# Patient Record
Sex: FEMALE | Race: WHITE | NOT HISPANIC OR LATINO
[De-identification: names, ages, dates, MRNs, and addresses within clinical notes are randomized per-mention and may not be internally consistent; named-entity substitution may affect disease eponyms.]

---

## 2017-10-11 PROBLEM — Z00.00 ENCOUNTER FOR PREVENTIVE HEALTH EXAMINATION: Status: ACTIVE | Noted: 2017-10-11

## 2017-10-24 ENCOUNTER — LABORATORY RESULT (OUTPATIENT)
Age: 37
End: 2017-10-24

## 2017-10-24 ENCOUNTER — APPOINTMENT (OUTPATIENT)
Dept: PULMONOLOGY | Facility: CLINIC | Age: 37
End: 2017-10-24
Payer: COMMERCIAL

## 2017-10-24 PROCEDURE — 93000 ELECTROCARDIOGRAM COMPLETE: CPT

## 2017-10-24 PROCEDURE — 36415 COLL VENOUS BLD VENIPUNCTURE: CPT

## 2017-10-24 PROCEDURE — 99203 OFFICE O/P NEW LOW 30 MIN: CPT | Mod: 25

## 2017-10-25 VITALS
HEART RATE: 59 BPM | TEMPERATURE: 97 F | WEIGHT: 158.07 LBS | DIASTOLIC BLOOD PRESSURE: 59 MMHG | SYSTOLIC BLOOD PRESSURE: 110 MMHG | OXYGEN SATURATION: 100 % | HEIGHT: 55 IN | RESPIRATION RATE: 16 BRPM

## 2017-10-25 NOTE — ASU PREOP CHECKLIST - SELECT TESTS ORDERED
CBC/EKG/Type and Screen/HCG/CMP/O+/Urinalysis/PT/PTT/INR O+     urine hcg-neg/PT/PTT/HCG/INR/CBC/Urinalysis/EKG/Type and Screen/CMP

## 2017-10-25 NOTE — ASU PATIENT PROFILE, ADULT - NS PRO AD PATIENT TYPE
Health Care Proxy (HCP)/Johnnie () Health Care Proxy (HCP)/Johnnie () [facility]   [phone]

## 2017-10-26 ENCOUNTER — INPATIENT (INPATIENT)
Facility: HOSPITAL | Age: 37
LOS: 0 days | Discharge: HOME | DRG: 743 | End: 2017-10-27
Attending: OBSTETRICS & GYNECOLOGY | Admitting: OBSTETRICS & GYNECOLOGY
Payer: COMMERCIAL

## 2017-10-26 ENCOUNTER — RESULT REVIEW (OUTPATIENT)
Age: 37
End: 2017-10-26

## 2017-10-26 DIAGNOSIS — Z98.890 OTHER SPECIFIED POSTPROCEDURAL STATES: Chronic | ICD-10-CM

## 2017-10-26 LAB
BLD GP AB SCN SERPL QL: NEGATIVE — SIGNIFICANT CHANGE UP
RH IG SCN BLD-IMP: POSITIVE — SIGNIFICANT CHANGE UP

## 2017-10-26 RX ORDER — ONDANSETRON 8 MG/1
4 TABLET, FILM COATED ORAL ONCE
Qty: 0 | Refills: 0 | Status: DISCONTINUED | OUTPATIENT
Start: 2017-10-26 | End: 2017-10-27

## 2017-10-26 RX ORDER — LAMOTRIGINE 25 MG/1
100 TABLET, ORALLY DISINTEGRATING ORAL DAILY
Qty: 0 | Refills: 0 | Status: DISCONTINUED | OUTPATIENT
Start: 2017-10-27 | End: 2017-10-27

## 2017-10-26 RX ORDER — DOCUSATE SODIUM 100 MG
100 CAPSULE ORAL
Qty: 0 | Refills: 0 | Status: DISCONTINUED | OUTPATIENT
Start: 2017-10-26 | End: 2017-10-27

## 2017-10-26 RX ORDER — KETOROLAC TROMETHAMINE 30 MG/ML
30 SYRINGE (ML) INJECTION EVERY 6 HOURS
Qty: 0 | Refills: 0 | Status: DISCONTINUED | OUTPATIENT
Start: 2017-10-26 | End: 2017-10-27

## 2017-10-26 RX ORDER — METOCLOPRAMIDE HCL 10 MG
10 TABLET ORAL EVERY 6 HOURS
Qty: 0 | Refills: 0 | Status: DISCONTINUED | OUTPATIENT
Start: 2017-10-26 | End: 2017-10-27

## 2017-10-26 RX ORDER — OXYCODONE AND ACETAMINOPHEN 5; 325 MG/1; MG/1
1 TABLET ORAL EVERY 6 HOURS
Qty: 0 | Refills: 0 | Status: DISCONTINUED | OUTPATIENT
Start: 2017-10-26 | End: 2017-10-27

## 2017-10-26 RX ORDER — SIMETHICONE 80 MG/1
80 TABLET, CHEWABLE ORAL
Qty: 0 | Refills: 0 | Status: DISCONTINUED | OUTPATIENT
Start: 2017-10-26 | End: 2017-10-27

## 2017-10-26 RX ORDER — LEVOTHYROXINE SODIUM 125 MCG
88 TABLET ORAL DAILY
Qty: 0 | Refills: 0 | Status: DISCONTINUED | OUTPATIENT
Start: 2017-10-27 | End: 2017-10-27

## 2017-10-26 RX ORDER — HYDROMORPHONE HYDROCHLORIDE 2 MG/ML
0.5 INJECTION INTRAMUSCULAR; INTRAVENOUS; SUBCUTANEOUS
Qty: 0 | Refills: 0 | Status: DISCONTINUED | OUTPATIENT
Start: 2017-10-26 | End: 2017-10-27

## 2017-10-26 RX ORDER — SODIUM CHLORIDE 9 MG/ML
1000 INJECTION, SOLUTION INTRAVENOUS
Qty: 0 | Refills: 0 | Status: DISCONTINUED | OUTPATIENT
Start: 2017-10-26 | End: 2017-10-27

## 2017-10-26 NOTE — PROGRESS NOTE ADULT - ASSESSMENT
37y Female POD# 0 s/p laparoscopic endo excision, dilation and curettage, hysteroscopy, recovering well                                     1. Neuro/Pain:  Toradol ATC  2  CV:   VS per routine  3. Pulm: Encourage ISS  4. GI: clears, AAT  5. :  Galvan in place, TOV in AM  6. Heme: AM CBC  7. ID: --  8. DVT ppx: SCDs  9. Dispo: Likely POD#1

## 2017-10-26 NOTE — PROGRESS NOTE ADULT - SUBJECTIVE AND OBJECTIVE BOX
GYN POC   Patient seen at bedside.  Feeling well.  Pain controlled. Tolerating sips.  Not OOB yet.  Galvan in place.    Denies CP, palpitations, SOB, fever, chills, nausea, vomiting.    Vital Signs Last 24 Hrs  T(C): 36.1 (26 Oct 2017 16:30), Max: 36.1 (26 Oct 2017 16:30)  T(F): 97 (26 Oct 2017 16:30), Max: 97 (26 Oct 2017 16:30)  HR: 66 (26 Oct 2017 19:00) (62 - 84)  BP: 101/58 (26 Oct 2017 19:00) (98/58 - 107/62)  BP(mean): 74 (26 Oct 2017 14:32) (70 - 74)  RR: 11 (26 Oct 2017 19:00) (0 - 20)  SpO2: 97% (26 Oct 2017 19:00) (97% - 100%)    Physical Exam:  Gen: No Acute Distress  Pulm: Clear to auscultation bilaterally  GI: soft, nontender, mildly distended, decreased BS, no rebound, no guarding.  Port sites c/d/i with gauze/tegaderms; ovarian suspensions in place bilaterally  : Galvan in place  Ext: SCDs in place, wnl    I&O's Summary    26 Oct 2017 07:01  -  26 Oct 2017 21:19  --------------------------------------------------------  IN: 455 mL / OUT: 295 mL / NET: 160 mL      MEDICATIONS  (STANDING):  docusate sodium 100 milliGRAM(s) Oral two times a day  ketorolac   Injectable 30 milliGRAM(s) IV Push every 6 hours  lactated ringers. 1000 milliLiter(s) (125 mL/Hr) IV Continuous <Continuous>  simethicone 80 milliGRAM(s) Chew two times a day    MEDICATIONS  (PRN):  HYDROmorphone  Injectable 0.5 milliGRAM(s) IV Push every 10 minutes PRN Severe Pain (7 - 10)  metoclopramide Injectable 10 milliGRAM(s) IV Push every 6 hours PRN Nausea and/or Vomiting  ondansetron Injectable 4 milliGRAM(s) IV Push once PRN Postoperative Nausea and/or Vomiting  oxyCODONE    5 mG/acetaminophen 325 mG 1 Tablet(s) Oral every 6 hours PRN Severe Pain (7 - 10)

## 2017-10-27 ENCOUNTER — TRANSCRIPTION ENCOUNTER (OUTPATIENT)
Age: 37
End: 2017-10-27

## 2017-10-27 VITALS
DIASTOLIC BLOOD PRESSURE: 62 MMHG | SYSTOLIC BLOOD PRESSURE: 104 MMHG | RESPIRATION RATE: 18 BRPM | HEART RATE: 63 BPM | OXYGEN SATURATION: 98 %

## 2017-10-27 LAB
HCT VFR BLD CALC: 32 % — LOW (ref 34.5–45)
HGB BLD-MCNC: 11.2 G/DL — LOW (ref 11.5–15.5)
MCHC RBC-ENTMCNC: 31.4 PG — SIGNIFICANT CHANGE UP (ref 27–34)
MCHC RBC-ENTMCNC: 35 G/DL — SIGNIFICANT CHANGE UP (ref 32–36)
MCV RBC AUTO: 89.6 FL — SIGNIFICANT CHANGE UP (ref 80–100)
PLATELET # BLD AUTO: 189 K/UL — SIGNIFICANT CHANGE UP (ref 150–400)
RBC # BLD: 3.57 M/UL — LOW (ref 3.8–5.2)
RBC # FLD: 12.3 % — SIGNIFICANT CHANGE UP (ref 10.3–16.9)
WBC # BLD: 5.1 K/UL — SIGNIFICANT CHANGE UP (ref 3.8–10.5)
WBC # FLD AUTO: 5.1 K/UL — SIGNIFICANT CHANGE UP (ref 3.8–10.5)

## 2017-10-27 PROCEDURE — 88305 TISSUE EXAM BY PATHOLOGIST: CPT

## 2017-10-27 PROCEDURE — 36415 COLL VENOUS BLD VENIPUNCTURE: CPT

## 2017-10-27 PROCEDURE — 86901 BLOOD TYPING SEROLOGIC RH(D): CPT

## 2017-10-27 PROCEDURE — 86900 BLOOD TYPING SEROLOGIC ABO: CPT

## 2017-10-27 PROCEDURE — 86850 RBC ANTIBODY SCREEN: CPT

## 2017-10-27 PROCEDURE — 85027 COMPLETE CBC AUTOMATED: CPT

## 2017-10-27 RX ORDER — LEVOTHYROXINE SODIUM 125 MCG
88 TABLET ORAL DAILY
Qty: 0 | Refills: 0 | Status: DISCONTINUED | OUTPATIENT
Start: 2017-10-27 | End: 2017-10-27

## 2017-10-27 RX ORDER — IBUPROFEN 200 MG
600 TABLET ORAL EVERY 6 HOURS
Qty: 0 | Refills: 0 | Status: DISCONTINUED | OUTPATIENT
Start: 2017-10-27 | End: 2017-10-27

## 2017-10-27 RX ORDER — SODIUM CHLORIDE 9 MG/ML
3 INJECTION INTRAMUSCULAR; INTRAVENOUS; SUBCUTANEOUS EVERY 8 HOURS
Qty: 0 | Refills: 0 | Status: DISCONTINUED | OUTPATIENT
Start: 2017-10-27 | End: 2017-10-27

## 2017-10-27 RX ADMIN — Medication 100 MILLIGRAM(S): at 06:04

## 2017-10-27 RX ADMIN — Medication 30 MILLIGRAM(S): at 06:04

## 2017-10-27 RX ADMIN — Medication 30 MILLIGRAM(S): at 07:05

## 2017-10-27 RX ADMIN — SIMETHICONE 80 MILLIGRAM(S): 80 TABLET, CHEWABLE ORAL at 06:04

## 2017-10-27 NOTE — DISCHARGE NOTE ADULT - CARE PLAN
Principal Discharge DX:	Endometriosis  Goal:	resumption of daily activity  Instructions for follow-up, activity and diet:	no heavy lifting for two weeks. call to schedule follow up appt in 1 week. tylenol and motrin as needed for pain.

## 2017-10-27 NOTE — DISCHARGE NOTE ADULT - PATIENT PORTAL LINK FT
“You can access the FollowHealth Patient Portal, offered by [facility], by registering with the following website: [email or web address]”

## 2017-10-27 NOTE — PROGRESS NOTE ADULT - ASSESSMENT
37y Female POD#1     s/p  L/S endo excision, hysteroscopy and D&C    1. Neuro/Pain:  OPM  2  CV:   VS per routine  3. Pulm: Encourage ISS  4. GI: Reg  5. :  TOV  6. Heme: Stable  7. ID: --  8. DVT ppx: SCDs  9. Dispo: Likely POD#1

## 2017-10-27 NOTE — PROGRESS NOTE ADULT - SUBJECTIVE AND OBJECTIVE BOX
Pt evaluated at bedside.  She reports pain is well controlled. She is tolerating clear liquids but has not yet passed flatus. h silver heavy vaginal bleeding.   She denies HA, dizzines, SOB, CP, palpitations, n/v.    T(C): 36.6 (10-27-17 @ 06:11), Max: 36.6 (10-27-17 @ 06:11)  HR: 54 (10-27-17 @ 06:11) (54 - 70)  BP: 101/61 (10-27-17 @ 06:11) (91/48 - 102/58)  RR: 15 (10-27-17 @ 06:11) (12 - 22)  SpO2: 100% (10-27-17 @ 06:11) (96% - 100%)  GA:  CV: RRR, no MRG  Pulm: CTAB  Abd: +BS, soft, NTND, no rebound or guarding: incisions C/D/I  : virgen in place  Extrem: SCDs in place    10-26 @ 07:01  -  10-27 @ 07:00  --------------------------------------------------------  IN: 1830 mL / OUT: 1665 mL / NET: 165 mL                              11.2   5.1   )-----------( 189      ( 27 Oct 2017 05:33 )             32.0

## 2017-10-27 NOTE — DISCHARGE NOTE ADULT - CARE PROVIDER_API CALL
Oneida Phillips), Obstetrics and Gynecology  [address] [place] [address]  Phone: [phone]  Fax: [phone]    Pat Almanzar (), Obstetrics and Gynecology  [address] [place] [address]  Phone: [phone]  Fax: [phone]

## 2017-10-27 NOTE — DISCHARGE NOTE ADULT - PLAN OF CARE
resumption of daily activity no heavy lifting for two weeks. call to schedule follow up appt in 1 week. tylenol and motrin as needed for pain.

## 2017-11-01 LAB — SURGICAL PATHOLOGY STUDY: SIGNIFICANT CHANGE UP

## 2017-11-05 DIAGNOSIS — N80.3 ENDOMETRIOSIS OF PELVIC PERITONEUM: ICD-10-CM

## 2017-11-05 DIAGNOSIS — N80.2 ENDOMETRIOSIS OF FALLOPIAN TUBE: ICD-10-CM

## 2017-11-05 DIAGNOSIS — E03.9 HYPOTHYROIDISM, UNSPECIFIED: ICD-10-CM

## 2017-11-05 DIAGNOSIS — N80.1 ENDOMETRIOSIS OF OVARY: ICD-10-CM

## 2017-11-05 DIAGNOSIS — N80.0 ENDOMETRIOSIS OF UTERUS: ICD-10-CM

## 2017-11-05 DIAGNOSIS — N80.9 ENDOMETRIOSIS, UNSPECIFIED: ICD-10-CM

## 2017-11-05 DIAGNOSIS — F41.9 ANXIETY DISORDER, UNSPECIFIED: ICD-10-CM

## 2017-11-05 DIAGNOSIS — N80.8 OTHER ENDOMETRIOSIS: ICD-10-CM

## 2017-11-05 DIAGNOSIS — N80.5 ENDOMETRIOSIS OF INTESTINE: ICD-10-CM

## 2019-06-10 ENCOUNTER — HOSPITAL ENCOUNTER (OUTPATIENT)
Dept: MAMMOGRAPHY | Age: 39
Discharge: HOME | End: 2019-06-10
Attending: OBSTETRICS & GYNECOLOGY
Payer: COMMERCIAL

## 2019-06-10 DIAGNOSIS — N63.20 LEFT BREAST LUMP: ICD-10-CM

## 2019-06-10 DIAGNOSIS — Z98.890 HISTORY OF LUMPECTOMY: ICD-10-CM

## 2019-06-10 PROCEDURE — 77066 DX MAMMO INCL CAD BI: CPT

## 2019-06-10 PROCEDURE — 76642 ULTRASOUND BREAST LIMITED: CPT

## 2019-11-12 ENCOUNTER — HOSPITAL ENCOUNTER (OUTPATIENT)
Dept: MAMMOGRAPHY | Age: 39
Discharge: HOME | End: 2019-11-12
Attending: OBSTETRICS & GYNECOLOGY
Payer: COMMERCIAL

## 2019-11-12 DIAGNOSIS — N63.20 LEFT BREAST LUMP: ICD-10-CM

## 2019-11-12 DIAGNOSIS — R92.2 BREAST DENSITY: ICD-10-CM

## 2019-11-12 PROCEDURE — 76642 ULTRASOUND BREAST LIMITED: CPT

## 2019-11-12 PROCEDURE — 77065 DX MAMMO INCL CAD UNI: CPT

## 2019-11-14 ENCOUNTER — HOSPITAL ENCOUNTER (OUTPATIENT)
Dept: MRI IMAGING | Age: 39
Discharge: HOME | End: 2019-11-14
Attending: OBSTETRICS & GYNECOLOGY
Payer: COMMERCIAL

## 2019-11-14 DIAGNOSIS — R92.8 ABNORMAL FINDING ON BREAST IMAGING: ICD-10-CM

## 2019-11-14 PROCEDURE — 74011000258 HC RX REV CODE- 258: Performed by: OBSTETRICS & GYNECOLOGY

## 2019-11-14 PROCEDURE — 77049 MRI BREAST C-+ W/CAD BI: CPT

## 2019-11-14 PROCEDURE — A9575 INJ GADOTERATE MEGLUMI 0.1ML: HCPCS | Performed by: OBSTETRICS & GYNECOLOGY

## 2019-11-14 PROCEDURE — 74011250636 HC RX REV CODE- 250/636: Performed by: OBSTETRICS & GYNECOLOGY

## 2019-11-14 RX ORDER — SODIUM CHLORIDE 0.9 % (FLUSH) 0.9 %
10 SYRINGE (ML) INJECTION
Status: COMPLETED | OUTPATIENT
Start: 2019-11-14 | End: 2019-11-14

## 2019-11-14 RX ORDER — GADOTERATE MEGLUMINE 376.9 MG/ML
15 INJECTION INTRAVENOUS
Status: COMPLETED | OUTPATIENT
Start: 2019-11-14 | End: 2019-11-14

## 2019-11-14 RX ADMIN — Medication 10 ML: at 11:31

## 2019-11-14 RX ADMIN — SODIUM CHLORIDE 100 ML: 900 INJECTION, SOLUTION INTRAVENOUS at 11:31

## 2019-11-14 RX ADMIN — GADOTERATE MEGLUMINE 15 ML: 376.9 INJECTION INTRAVENOUS at 11:31

## 2019-11-18 ENCOUNTER — HOSPITAL ENCOUNTER (OUTPATIENT)
Dept: MAMMOGRAPHY | Age: 39
Discharge: HOME | End: 2019-11-18
Attending: OBSTETRICS & GYNECOLOGY
Payer: COMMERCIAL

## 2019-11-18 VITALS — SYSTOLIC BLOOD PRESSURE: 122 MMHG | DIASTOLIC BLOOD PRESSURE: 87 MMHG

## 2019-11-18 DIAGNOSIS — R92.8 ABNORMAL MAMMOGRAM OF LEFT BREAST: ICD-10-CM

## 2019-11-18 DIAGNOSIS — N63.20 MASS OF LEFT BREAST: ICD-10-CM

## 2019-11-18 PROCEDURE — 74011000250 HC RX REV CODE- 250: Performed by: OBSTETRICS & GYNECOLOGY

## 2019-11-18 PROCEDURE — 88305 TISSUE EXAM BY PATHOLOGIST: CPT

## 2019-11-18 PROCEDURE — 77065 DX MAMMO INCL CAD UNI: CPT

## 2019-11-18 PROCEDURE — 19083 BX BREAST 1ST LESION US IMAG: CPT

## 2019-11-18 RX ORDER — LIDOCAINE HYDROCHLORIDE 10 MG/ML
5 INJECTION INFILTRATION; PERINEURAL
Status: COMPLETED | OUTPATIENT
Start: 2019-11-18 | End: 2019-11-18

## 2019-11-18 RX ADMIN — LIDOCAINE HYDROCHLORIDE 5 ML: 10 INJECTION, SOLUTION INFILTRATION; PERINEURAL at 10:32

## 2019-11-22 ENCOUNTER — HOSPITAL ENCOUNTER (OUTPATIENT)
Dept: MAMMOGRAPHY | Age: 39
Discharge: HOME | End: 2019-11-22
Attending: OBSTETRICS & GYNECOLOGY
Payer: COMMERCIAL

## 2019-11-22 VITALS — OXYGEN SATURATION: 100 % | SYSTOLIC BLOOD PRESSURE: 109 MMHG | DIASTOLIC BLOOD PRESSURE: 71 MMHG | TEMPERATURE: 99 F

## 2019-11-22 DIAGNOSIS — R92.8 ABNORMAL MRI, BREAST: ICD-10-CM

## 2019-11-22 PROCEDURE — 74011250636 HC RX REV CODE- 250/636: Performed by: OBSTETRICS & GYNECOLOGY

## 2019-11-22 PROCEDURE — 19081 BX BREAST 1ST LESION STRTCTC: CPT

## 2019-11-22 PROCEDURE — 88305 TISSUE EXAM BY PATHOLOGIST: CPT

## 2019-11-22 PROCEDURE — 74011000250 HC RX REV CODE- 250: Performed by: OBSTETRICS & GYNECOLOGY

## 2019-11-22 PROCEDURE — 77065 DX MAMMO INCL CAD UNI: CPT

## 2019-11-22 RX ORDER — LIDOCAINE HYDROCHLORIDE 10 MG/ML
5 INJECTION INFILTRATION; PERINEURAL
Status: COMPLETED | OUTPATIENT
Start: 2019-11-22 | End: 2019-11-22

## 2019-11-22 RX ORDER — LIDOCAINE HYDROCHLORIDE AND EPINEPHRINE 10; 10 MG/ML; UG/ML
10 INJECTION, SOLUTION INFILTRATION; PERINEURAL
Status: COMPLETED | OUTPATIENT
Start: 2019-11-22 | End: 2019-11-22

## 2019-11-22 RX ADMIN — LIDOCAINE HYDROCHLORIDE AND EPINEPHRINE 15 ML: 10; 10 INJECTION, SOLUTION INFILTRATION; PERINEURAL at 10:21

## 2019-11-22 RX ADMIN — SODIUM CHLORIDE 250 ML: 900 INJECTION, SOLUTION INTRAVENOUS at 10:22

## 2019-11-22 RX ADMIN — LIDOCAINE HYDROCHLORIDE 5 ML: 10 INJECTION, SOLUTION INFILTRATION; PERINEURAL at 10:21

## 2019-11-25 NOTE — PROGRESS NOTES
I spoke with Ms Ihsan Haywood via phone regarding the results of her Lt breast stereo bx. Pathology: benign. She was a bit sore but is feeling much better today. She is being referred for a surgical consult to Dr Hudson Fowler on 92-3-80 @ 42:68.

## 2019-12-30 PROBLEM — N63.0 BREAST MASS IN FEMALE: Status: ACTIVE | Noted: 2019-12-30

## 2020-05-26 NOTE — DISCHARGE NOTE ADULT - BECAUSE OF A PHYSICAL, MENTAL OR EMOTIONAL CONDITION, DO YOU HAVE DIFFICULTY DOING  ERRANDS ALONE LIKE VISITING A DOCTOR'S OFFICE OR SHOPPING (15 YEARS AND OLDER)
PHYSICAL EXAM:  GENERAL: NAD, well-developed, tremulous  HEAD:  Atraumatic, Normocephalic  EYES: EOMI, PERRLA, conjunctiva and sclera clear  NECK: Supple, No JVD  CHEST/LUNG: Clear to auscultation bilaterally; No wheeze  HEART: Regular rate and rhythm; No murmurs, rubs, or gallops  ABDOMEN: Soft, Nontender, Nondistended; Bowel sounds present  EXTREMITIES:  2+ Peripheral Pulses, No clubbing, cyanosis, or edema  PSYCH: AAOx3  NEUROLOGY: non-focal  SKIN: No rashes or lesions
No

## 2022-09-12 PROBLEM — F41.9 ANXIETY DISORDER, UNSPECIFIED: Chronic | Status: ACTIVE | Noted: 2017-10-25

## 2022-09-12 PROBLEM — E03.9 HYPOTHYROIDISM, UNSPECIFIED: Chronic | Status: ACTIVE | Noted: 2017-10-25

## 2022-09-12 PROBLEM — N80.9 ENDOMETRIOSIS, UNSPECIFIED: Chronic | Status: ACTIVE | Noted: 2017-10-26

## 2022-09-14 ENCOUNTER — TRANSCRIPTION ENCOUNTER (OUTPATIENT)
Age: 42
End: 2022-09-14

## 2022-09-14 VITALS
WEIGHT: 179.02 LBS | HEART RATE: 65 BPM | HEIGHT: 66 IN | OXYGEN SATURATION: 97 % | SYSTOLIC BLOOD PRESSURE: 108 MMHG | DIASTOLIC BLOOD PRESSURE: 68 MMHG | TEMPERATURE: 98 F | RESPIRATION RATE: 16 BRPM

## 2022-09-14 RX ORDER — LAMOTRIGINE 25 MG/1
300 TABLET, ORALLY DISINTEGRATING ORAL
Qty: 0 | Refills: 0 | DISCHARGE

## 2022-09-14 RX ORDER — LEVOTHYROXINE SODIUM 125 MCG
1 TABLET ORAL
Qty: 0 | Refills: 0 | DISCHARGE

## 2022-09-14 RX ORDER — LAMOTRIGINE 25 MG/1
1 TABLET, ORALLY DISINTEGRATING ORAL
Qty: 0 | Refills: 0 | DISCHARGE

## 2022-09-14 NOTE — ASU PREOP CHECKLIST - HEIGHT IN FEET
EXAMINATION TYPE: CT angio chest

 

DATE OF EXAM: 5/1/2022

 

COMPARISON: Chest x-ray earlier today

 

HISTORY: covid, sob. Syncope and weakness. Elevated d-dimer.

 

CT DLP: 229.8 mGycm. Automated Exposure Control for Dose Reduction was Utilized.

 

CONTRAST: 

CTA scan of the thorax is performed with IV Contrast, patient injected with 100 mL of Isovue 370, pul
monary embolism protocol.   MIP Images are created on CT scanner and reviewed.

 

FINDINGS:

 

LUNGS: The lungs are grossly clear, there is no concerning parenchymal mass or nodule identified.   T
here is no pleural effusion or pneumothorax seen.  The tracheobronchial tree is patent.

 

MEDIASTINUM: There is satisfactory enhancement of the pulmonary artery and its branches, there is no 
CT evidence for pulmonary embolism.  Satisfactory enhancement of the thoracic aorta without aneurysm 
or dissection. There are no greater than 1 cm hilar or mediastinal lymph nodes.   No cardiomegaly or 
pericardial effusion is seen. There is a 4 vessel origins from the aortic arch which is normal varian
t. Coronary artery calcification is present which is noted marker for underlying coronary artery dise
ase.

 

OTHER:  No additional significant abnormality is seen.

 

IMPRESSION: No CT evidence for acute pulmonary embolism. No suspicious acute pulmonary process. 5

## 2022-09-14 NOTE — ASU PREOP CHECKLIST - 1.
ESCORT: ESCORT:Johnnie () [facility]   [phone] ESCORT: Johnnie () [facility]   [phone]

## 2022-09-14 NOTE — ASU PATIENT PROFILE, ADULT - REASON FOR ADMISSION, PROFILE
robotic assisted lap excision of endometriosis , lap total hysterectomy B/L salpingectomy , oophorectomy

## 2022-09-15 ENCOUNTER — TRANSCRIPTION ENCOUNTER (OUTPATIENT)
Age: 42
End: 2022-09-15

## 2022-09-15 ENCOUNTER — INPATIENT (INPATIENT)
Facility: HOSPITAL | Age: 42
LOS: 0 days | Discharge: HOME | DRG: 743 | End: 2022-09-16
Attending: OBSTETRICS & GYNECOLOGY | Admitting: OBSTETRICS & GYNECOLOGY
Payer: COMMERCIAL

## 2022-09-15 ENCOUNTER — RESULT REVIEW (OUTPATIENT)
Age: 42
End: 2022-09-15

## 2022-09-15 DIAGNOSIS — Z98.890 OTHER SPECIFIED POSTPROCEDURAL STATES: Chronic | ICD-10-CM

## 2022-09-15 LAB
BLD GP AB SCN SERPL QL: NEGATIVE — SIGNIFICANT CHANGE UP
GLUCOSE BLDC GLUCOMTR-MCNC: 88 MG/DL — SIGNIFICANT CHANGE UP (ref 70–99)
RH IG SCN BLD-IMP: POSITIVE — SIGNIFICANT CHANGE UP

## 2022-09-15 PROCEDURE — 88307 TISSUE EXAM BY PATHOLOGIST: CPT | Mod: 26

## 2022-09-15 PROCEDURE — 88305 TISSUE EXAM BY PATHOLOGIST: CPT | Mod: 26

## 2022-09-15 PROCEDURE — 88302 TISSUE EXAM BY PATHOLOGIST: CPT | Mod: 26

## 2022-09-15 DEVICE — URETERAL CATH OPEN END FLEXI-TIP 5FR .038" X 70CM: Type: IMPLANTABLE DEVICE | Status: FUNCTIONAL

## 2022-09-15 RX ORDER — GABAPENTIN 400 MG/1
600 CAPSULE ORAL ONCE
Refills: 0 | Status: COMPLETED | OUTPATIENT
Start: 2022-09-15 | End: 2022-09-15

## 2022-09-15 RX ORDER — SODIUM CHLORIDE 9 MG/ML
1000 INJECTION, SOLUTION INTRAVENOUS
Refills: 0 | Status: DISCONTINUED | OUTPATIENT
Start: 2022-09-15 | End: 2022-09-16

## 2022-09-15 RX ORDER — ONDANSETRON 8 MG/1
8 TABLET, FILM COATED ORAL EVERY 8 HOURS
Refills: 0 | Status: DISCONTINUED | OUTPATIENT
Start: 2022-09-15 | End: 2022-09-16

## 2022-09-15 RX ORDER — POLYETHYLENE GLYCOL 3350 17 G/17G
17 POWDER, FOR SOLUTION ORAL AT BEDTIME
Refills: 0 | Status: DISCONTINUED | OUTPATIENT
Start: 2022-09-15 | End: 2022-09-16

## 2022-09-15 RX ORDER — ACETAMINOPHEN 500 MG
1000 TABLET ORAL EVERY 6 HOURS
Refills: 0 | Status: DISCONTINUED | OUTPATIENT
Start: 2022-09-15 | End: 2022-09-16

## 2022-09-15 RX ORDER — PHENAZOPYRIDINE HCL 100 MG
200 TABLET ORAL EVERY 8 HOURS
Refills: 0 | Status: DISCONTINUED | OUTPATIENT
Start: 2022-09-15 | End: 2022-09-16

## 2022-09-15 RX ORDER — METOCLOPRAMIDE HCL 10 MG
10 TABLET ORAL EVERY 8 HOURS
Refills: 0 | Status: DISCONTINUED | OUTPATIENT
Start: 2022-09-15 | End: 2022-09-16

## 2022-09-15 RX ORDER — LAMOTRIGINE 25 MG/1
150 TABLET, ORALLY DISINTEGRATING ORAL EVERY 12 HOURS
Refills: 0 | Status: DISCONTINUED | OUTPATIENT
Start: 2022-09-15 | End: 2022-09-16

## 2022-09-15 RX ORDER — LEVOTHYROXINE SODIUM 125 MCG
112 TABLET ORAL DAILY
Refills: 0 | Status: DISCONTINUED | OUTPATIENT
Start: 2022-09-16 | End: 2022-09-16

## 2022-09-15 RX ORDER — HYDROMORPHONE HYDROCHLORIDE 2 MG/ML
0.5 INJECTION INTRAMUSCULAR; INTRAVENOUS; SUBCUTANEOUS
Refills: 0 | Status: DISCONTINUED | OUTPATIENT
Start: 2022-09-15 | End: 2022-09-16

## 2022-09-15 RX ORDER — KETOROLAC TROMETHAMINE 30 MG/ML
30 SYRINGE (ML) INJECTION EVERY 6 HOURS
Refills: 0 | Status: DISCONTINUED | OUTPATIENT
Start: 2022-09-15 | End: 2022-09-16

## 2022-09-15 RX ORDER — ACETAMINOPHEN 500 MG
1000 TABLET ORAL ONCE
Refills: 0 | Status: COMPLETED | OUTPATIENT
Start: 2022-09-15 | End: 2022-09-15

## 2022-09-15 RX ORDER — SIMETHICONE 80 MG/1
80 TABLET, CHEWABLE ORAL EVERY 6 HOURS
Refills: 0 | Status: DISCONTINUED | OUTPATIENT
Start: 2022-09-15 | End: 2022-09-16

## 2022-09-15 RX ADMIN — Medication 1000 MILLIGRAM(S): at 22:04

## 2022-09-15 RX ADMIN — Medication 1000 MILLIGRAM(S): at 08:58

## 2022-09-15 RX ADMIN — GABAPENTIN 600 MILLIGRAM(S): 400 CAPSULE ORAL at 08:58

## 2022-09-15 RX ADMIN — Medication 7.5 MILLIGRAM(S): at 19:00

## 2022-09-15 RX ADMIN — Medication 1000 MILLIGRAM(S): at 17:01

## 2022-09-15 RX ADMIN — SODIUM CHLORIDE 125 MILLILITER(S): 9 INJECTION, SOLUTION INTRAVENOUS at 22:05

## 2022-09-15 RX ADMIN — SODIUM CHLORIDE 125 MILLILITER(S): 9 INJECTION, SOLUTION INTRAVENOUS at 15:34

## 2022-09-15 RX ADMIN — Medication 1000 MILLIGRAM(S): at 22:44

## 2022-09-15 RX ADMIN — LAMOTRIGINE 150 MILLIGRAM(S): 25 TABLET, ORALLY DISINTEGRATING ORAL at 19:00

## 2022-09-15 RX ADMIN — Medication 30 MILLIGRAM(S): at 18:58

## 2022-09-15 RX ADMIN — Medication 1000 MILLIGRAM(S): at 17:48

## 2022-09-15 RX ADMIN — Medication 200 MILLIGRAM(S): at 22:04

## 2022-09-15 RX ADMIN — Medication 30 MILLIGRAM(S): at 19:20

## 2022-09-15 RX ADMIN — POLYETHYLENE GLYCOL 3350 17 GRAM(S): 17 POWDER, FOR SOLUTION ORAL at 22:04

## 2022-09-15 NOTE — DISCHARGE NOTE PROVIDER - CARE PROVIDER_API CALL
Ayana Solares)  Obstetrics and Gynecology  [address] [place] [id number]  Phone: [phone]  Fax: [phone]  Follow Up Time:    Calcipotriene Pregnancy And Lactation Text: This medication has not been proven safe during pregnancy. It is unknown if this medication is excreted in breast milk.

## 2022-09-15 NOTE — PRE-ANESTHESIA EVALUATION ADULT - NSPROPOSEDPROCEDFT_GEN_ALL_CORE
EUA, Robotic assisted laprascopic supracervical vs total hysterectomy b/l Salpingo-oophrectomy excision of endometriosis, cystoscopy with retrograde ICG inection

## 2022-09-15 NOTE — DISCHARGE NOTE PROVIDER - HOSPITAL COURSE
Pt admitted for TLH, BSO, endo ex, uncomplicated. POD1 pt passed TOV, pt meeting postoperative milestones, pt hemodynamically and clinically stable for D/C 40 yo s/p TLH, BSO, endo excision, cystoscopy w/ ICG overall uncomplicated. Pt admitted post operatively for pain control and post operative management. On POD1 pt passed TOV, pt meeting postoperative milestones- voiding, ambulating, tolerating regular diet. Pt hemodynamically and clinically stable for D/C.  Will follow up with Dr Solares outpatient.

## 2022-09-15 NOTE — DISCHARGE NOTE PROVIDER - NSDCFUADDINST_GEN_ALL_CORE_FT
PAST SURGICAL HISTORY:  No significant past surgical history - Nothing in vagina - no intercourse, tampons, or douching until cleared by your doctor.   - Avoid swimming, tub baths, and heavy lifting until cleared by your doctor.   - Showering is ok.   - Continue oral pain medications as needed for pain.    - Follow up in office in 1-2 weeks for your postoperative visit.    - Call the office sooner if you develop any fever, heavy bleeding, or severe pain.  Go to the closest emergency room for any of these symptoms if you are not able to contact your doctor.

## 2022-09-15 NOTE — H&P ADULT - ASSESSMENT
42yo GO presents for scheduled TLH, BS, endo ex, cysto    -NPO  -IVF  -consented  d/w Dr. Phillips and Dr. Solares

## 2022-09-15 NOTE — DISCHARGE NOTE PROVIDER - NSDCCPTREATMENT_GEN_ALL_CORE_FT
PRINCIPAL PROCEDURE  Procedure: Laparoscopic abdominal hysterectomy with bilateral salpingo-oophorectomy (BSO) and cystoscopy  Findings and Treatment:       SECONDARY PROCEDURE  Procedure: Excision, endometriosis, laparoscopic  Findings and Treatment:

## 2022-09-15 NOTE — DISCHARGE NOTE PROVIDER - NSDCCPCAREPLAN_GEN_ALL_CORE_FT
PRINCIPAL DISCHARGE DIAGNOSIS  Diagnosis: Endometriosis  Assessment and Plan of Treatment:        PRINCIPAL DISCHARGE DIAGNOSIS  Diagnosis: Pain pelvic  Assessment and Plan of Treatment:

## 2022-09-15 NOTE — DISCHARGE NOTE PROVIDER - NSDCMRMEDTOKEN_GEN_ALL_CORE_FT
BuSpar: 15  orally  LaMICtal 150 mg oral tablet: 1 tab(s) orally 2 times a day  Synthroid 112 mcg (0.112 mg) oral tablet: 1 tab(s) orally once a day   acetaminophen 500 mg oral tablet: 2 tab(s) orally every 6 hours  BuSpar: 15 milligram(s) orally once a day  LaMICtal 150 mg oral tablet: 1 tab(s) orally 2 times a day  polyethylene glycol 3350 oral powder for reconstitution: 17 gram(s) orally once a day (at bedtime)  Synthroid 112 mcg (0.112 mg) oral tablet: 1 tab(s) orally once a day

## 2022-09-15 NOTE — BRIEF OPERATIVE NOTE - NSICDXBRIEFPROCEDURE_GEN_ALL_CORE_FT
PROCEDURES:  Laparoscopic abdominal hysterectomy with bilateral salpingo-oophorectomy (BSO) and cystoscopy 15-Sep-2022 14:18:13  Francesco Alvarenga  Excision, endometriosis, laparoscopic 15-Sep-2022 14:18:21  Francesco Alvarenga  Bilateral ureterolysis 15-Sep-2022 14:18:32  Francesco Alvarenga

## 2022-09-15 NOTE — BRIEF OPERATIVE NOTE - OPERATION/FINDINGS
cysto w/ ICG done, entered with varess, insulfated, normal uterus, normal tubes b/l ovaries wnl, endometriosis noted anterior R broad ligament. TLH BSO in normal fashion, closed cuff with 0-vicryl interrupted sutured, excellent hemostasis, cysto done b/l jets wnl

## 2022-09-15 NOTE — PROGRESS NOTE ADULT - SUBJECTIVE AND OBJECTIVE BOX
GYN POC  40 yo s/p [facility] BSO endo excision cystoscopy with ICG. Pt seen and examined at bedside. Pt complains of mild abdominal pain which feels like period cramping.  Pt denies any fever, chills, chest pain, SOB, nausea or vomiting     T(F): 98.7 (09-15-22 @ 16:51), Max: 98.7 (09-15-22 @ 16:51)  HR: 76 (09-15-22 @ 16:51) (65 - 83)  BP: 104/68 (09-15-22 @ 16:51) (94/57 - 115/64)  RR: 18 (09-15-22 @ 16:51) (11 - 20)  SpO2: 99% (09-15-22 @ 16:51) (96% - 100%)    09-15 @ 07:01  -  09-15 @ 17:17  --------------------------------------------------------  IN: 375 mL / OUT: 180 mL / NET: 195 mL        acetaminophen     Tablet .. 1000 milliGRAM(s) Oral every 6 hours  busPIRone 7.5 milliGRAM(s) Oral every 12 hours  HYDROmorphone  Injectable 0.5 milliGRAM(s) IV Push every 15 minutes PRN Severe Pain (7 - 10)  ketorolac   Injectable 30 milliGRAM(s) IV Push every 6 hours  lactated ringers. 1000 milliLiter(s) IV Continuous <Continuous>  lamoTRIgine 150 milliGRAM(s) Oral every 12 hours  metoclopramide Injectable 10 milliGRAM(s) IV Push every 8 hours PRN Nausea and/or Vomiting  ondansetron Injectable 8 milliGRAM(s) IV Push every 8 hours PRN Nausea and/or Vomiting  phenazopyridine 200 milliGRAM(s) Oral every 8 hours  polyethylene glycol 3350 17 Gram(s) Oral at bedtime  simethicone 80 milliGRAM(s) Chew every 6 hours PRN Gas      Physical exam:  Constitutional: NAD  Pulmonary: clear to auscultation bilaterally  Cardiovascular: regular rate and rhythm  Abdomen: incision sites clean, dry and intact. Soft, mildly tender, nondistended  Extremities: no lower extremity edema, or calve tenderness. SCDs in place    A: 40 yo POD0 doing well at post operative check     Plan:  Staying overnight for pain control & post operative management.  Neuro: Toradol and Tylenol standing,  Dilaudid for breakthrough.  Lamictal 150 BID, buspar 7.5 BID   Cardio: vital signs stable, continue to monitor per protocol  Pulm: incentive spirometer at least 10 times per hour while awake   GI:    Reglan, Zofran prn, Simethicone, miralax  : Galvan catheter   Follow up labs   DVT ppx: SCDs   Activity:

## 2022-09-15 NOTE — H&P ADULT - HISTORY OF PRESENT ILLNESS
42yo Go LMP 8/3 presents for scheduled surgery. Pt endorses h/o L>R pelvic pain, less leg pain that prior surgery, endorses irregular intermittant vaginal pain and denies dyspareunia, bowel symptoms and bladder symptoms Endorses heavy periods requiring 2+ period underwear/day. Denies fevers, Chills, CP, SOB, N/V/D.     OB: G0  GYN: + fibroids, + endometriomas b/l  PMH: anxiety, hypothyroidism  PSH: 2008 l/s Endo ex, 2017 l/s endo ex and appy, 2017 L breast lumpectomy (benign)  MED: synthroid 112mcg qd, lamictal 150 BID, Buspar 7.5 BID  ALL: NKDA

## 2022-09-16 ENCOUNTER — TRANSCRIPTION ENCOUNTER (OUTPATIENT)
Age: 42
End: 2022-09-16

## 2022-09-16 VITALS
DIASTOLIC BLOOD PRESSURE: 59 MMHG | TEMPERATURE: 98 F | RESPIRATION RATE: 16 BRPM | OXYGEN SATURATION: 98 % | HEART RATE: 71 BPM | SYSTOLIC BLOOD PRESSURE: 95 MMHG

## 2022-09-16 LAB
BASOPHILS # BLD AUTO: 0 K/UL — SIGNIFICANT CHANGE UP (ref 0–0.2)
BASOPHILS NFR BLD AUTO: 0 % — SIGNIFICANT CHANGE UP (ref 0–2)
EOSINOPHIL # BLD AUTO: 0.01 K/UL — SIGNIFICANT CHANGE UP (ref 0–0.5)
EOSINOPHIL NFR BLD AUTO: 0.2 % — SIGNIFICANT CHANGE UP (ref 0–6)
HCT VFR BLD CALC: 32 % — LOW (ref 34.5–45)
HGB BLD-MCNC: 10.9 G/DL — LOW (ref 11.5–15.5)
IMM GRANULOCYTES NFR BLD AUTO: 0.2 % — SIGNIFICANT CHANGE UP (ref 0–0.9)
LYMPHOCYTES # BLD AUTO: 0.77 K/UL — LOW (ref 1–3.3)
LYMPHOCYTES # BLD AUTO: 13.5 % — SIGNIFICANT CHANGE UP (ref 13–44)
MCHC RBC-ENTMCNC: 31.4 PG — SIGNIFICANT CHANGE UP (ref 27–34)
MCHC RBC-ENTMCNC: 34.1 GM/DL — SIGNIFICANT CHANGE UP (ref 32–36)
MCV RBC AUTO: 92.2 FL — SIGNIFICANT CHANGE UP (ref 80–100)
MONOCYTES # BLD AUTO: 0.79 K/UL — SIGNIFICANT CHANGE UP (ref 0–0.9)
MONOCYTES NFR BLD AUTO: 13.9 % — SIGNIFICANT CHANGE UP (ref 2–14)
NEUTROPHILS # BLD AUTO: 4.11 K/UL — SIGNIFICANT CHANGE UP (ref 1.8–7.4)
NEUTROPHILS NFR BLD AUTO: 72.2 % — SIGNIFICANT CHANGE UP (ref 43–77)
NRBC # BLD: 0 /100 WBCS — SIGNIFICANT CHANGE UP (ref 0–0)
PLATELET # BLD AUTO: 235 K/UL — SIGNIFICANT CHANGE UP (ref 150–400)
RBC # BLD: 3.47 M/UL — LOW (ref 3.8–5.2)
RBC # FLD: 12.1 % — SIGNIFICANT CHANGE UP (ref 10.3–14.5)
WBC # BLD: 5.69 K/UL — SIGNIFICANT CHANGE UP (ref 3.8–10.5)
WBC # FLD AUTO: 5.69 K/UL — SIGNIFICANT CHANGE UP (ref 3.8–10.5)

## 2022-09-16 PROCEDURE — 86900 BLOOD TYPING SEROLOGIC ABO: CPT

## 2022-09-16 PROCEDURE — C1758: CPT

## 2022-09-16 PROCEDURE — 88302 TISSUE EXAM BY PATHOLOGIST: CPT

## 2022-09-16 PROCEDURE — 86850 RBC ANTIBODY SCREEN: CPT

## 2022-09-16 PROCEDURE — 85025 COMPLETE CBC W/AUTO DIFF WBC: CPT

## 2022-09-16 PROCEDURE — 36415 COLL VENOUS BLD VENIPUNCTURE: CPT

## 2022-09-16 PROCEDURE — 82962 GLUCOSE BLOOD TEST: CPT

## 2022-09-16 PROCEDURE — 88305 TISSUE EXAM BY PATHOLOGIST: CPT

## 2022-09-16 PROCEDURE — 86901 BLOOD TYPING SEROLOGIC RH(D): CPT

## 2022-09-16 PROCEDURE — 88307 TISSUE EXAM BY PATHOLOGIST: CPT

## 2022-09-16 RX ORDER — ACETAMINOPHEN 500 MG
2 TABLET ORAL
Qty: 0 | Refills: 0 | DISCHARGE
Start: 2022-09-16

## 2022-09-16 RX ORDER — LEVOTHYROXINE SODIUM 125 MCG
112 TABLET ORAL DAILY
Refills: 0 | Status: DISCONTINUED | OUTPATIENT
Start: 2022-09-16 | End: 2022-09-16

## 2022-09-16 RX ORDER — POLYETHYLENE GLYCOL 3350 17 G/17G
17 POWDER, FOR SOLUTION ORAL
Qty: 0 | Refills: 0 | DISCHARGE
Start: 2022-09-16

## 2022-09-16 RX ADMIN — Medication 1000 MILLIGRAM(S): at 11:54

## 2022-09-16 RX ADMIN — Medication 30 MILLIGRAM(S): at 09:30

## 2022-09-16 RX ADMIN — Medication 200 MILLIGRAM(S): at 05:42

## 2022-09-16 RX ADMIN — Medication 1000 MILLIGRAM(S): at 05:56

## 2022-09-16 RX ADMIN — LAMOTRIGINE 150 MILLIGRAM(S): 25 TABLET, ORALLY DISINTEGRATING ORAL at 07:39

## 2022-09-16 RX ADMIN — Medication 30 MILLIGRAM(S): at 02:07

## 2022-09-16 RX ADMIN — Medication 30 MILLIGRAM(S): at 02:02

## 2022-09-16 RX ADMIN — Medication 112 MICROGRAM(S): at 06:14

## 2022-09-16 RX ADMIN — Medication 7.5 MILLIGRAM(S): at 07:39

## 2022-09-16 RX ADMIN — Medication 1000 MILLIGRAM(S): at 05:42

## 2022-09-16 RX ADMIN — Medication 30 MILLIGRAM(S): at 09:16

## 2022-09-16 NOTE — DISCHARGE NOTE NURSING/CASE MANAGEMENT/SOCIAL WORK - NSDCPEFALRISK_GEN_ALL_CORE
For information on Fall & Injury Prevention, visit: [email or web address] OR  [email or web address] OR  https://www.cdc.gov/steadi/patient.html

## 2022-09-16 NOTE — DISCHARGE NOTE NURSING/CASE MANAGEMENT/SOCIAL WORK - PATIENT PORTAL LINK FT
You can access the FollowMyHealth Patient Portal offered by [facility] by registering at the following website: [email or web address]. By joining [employer]’s FollowMyHealth portal, you will also be able to view your health information using other applications (apps) compatible with our system.

## 2022-09-20 DIAGNOSIS — R10.2 PELVIC AND PERINEAL PAIN: ICD-10-CM

## 2022-09-20 DIAGNOSIS — E03.9 HYPOTHYROIDISM, UNSPECIFIED: ICD-10-CM

## 2022-09-20 DIAGNOSIS — N80.9 ENDOMETRIOSIS, UNSPECIFIED: ICD-10-CM

## 2022-09-20 DIAGNOSIS — F41.9 ANXIETY DISORDER, UNSPECIFIED: ICD-10-CM

## 2022-09-20 DIAGNOSIS — Z79.890 HORMONE REPLACEMENT THERAPY: ICD-10-CM

## 2022-09-20 DIAGNOSIS — F32.81 PREMENSTRUAL DYSPHORIC DISORDER: ICD-10-CM

## 2022-09-22 LAB — SURGICAL PATHOLOGY STUDY: SIGNIFICANT CHANGE UP

## 2023-06-10 NOTE — ASU PATIENT PROFILE, ADULT - BLOOD TRANSFUSION, PREVIOUS, PROFILE
HPI:  This is a 48 y/o female with pre-operative diagnosis of left breast cancer.  Pt noted lump in left breast.  Biopsy done in 11/2022 confirmed malignancy.  s/p chemo which was completed in 5/9/23.  Otherwise patient feels well today and denies any acute symptoms.    (26 May 2023 12:40)    The patient was admitted on June 9 and underwent the scheduled procedures Bilateral nipple sparing mastectomy /left axillary sentinel node bx/JOHN flap reconstruction .    The patient has had an uneventful post op course.    Her breasts are soft, vioptix stable, incisions intact, drains patent with appropriate drainage. Abd:  clean and dry incisions intact, no hematomas , drains patent and appropriate drainage.    Patient has been given post op instructions and she will follow up with Dr Thompson next week.  Visiting nurse services have been place.      no

## (undated) DEVICE — D HELP - CLEARVIEW CLEARIFY SYSTEM

## (undated) DEVICE — SUT MONOCRYL 4-0 27" PS-2 UNDYED

## (undated) DEVICE — GLV 6.5 PROTEXIS (WHITE)

## (undated) DEVICE — TUBING AQUILEX OUTFLOW

## (undated) DEVICE — PACK GENERAL CLOSING

## (undated) DEVICE — TUBING STRYKER PNEUMOCLEAR SMOKE EVACUATION HIGH FLOW

## (undated) DEVICE — DRSG STERISTRIPS 0.5 X 4"

## (undated) DEVICE — TROCAR COOPER SURGICAL LAPAROSCOPIC CANNULA 5MM

## (undated) DEVICE — TROCAR ETHICON ENDOPATH MINI BLADELESS 2/3MM X 100MM SMOOTH

## (undated) DEVICE — SUT VICRYL 2-0 27" SH

## (undated) DEVICE — DRAPE LIGHT HANDLE COVER (BLUE)

## (undated) DEVICE — SHEARS HARMONIC ACE 5MM X 36CM CURVED TIP

## (undated) DEVICE — PACK DEL VAG LNX SURGICOUNT

## (undated) DEVICE — POSITIONER FOAM EGG CRATE ULNAR 2PCS (PINK)

## (undated) DEVICE — ELCTR CUTTING LOOP FOR PRINCESS RESECTOSCOPE 21FR

## (undated) DEVICE — BLADE SURGICAL #10 CARBON

## (undated) DEVICE — SUT MONOCRYL 2-0 27" SH VIOLET

## (undated) DEVICE — FOLEY TRAY 16FR 5CC LF UMETER CLOSED

## (undated) DEVICE — TROCAR COOPER SURGICAL PYRAMIDAL TIP 5X10.16

## (undated) DEVICE — DRAINAGE BAG URINARY 4000CC

## (undated) DEVICE — CATH ANGIOCATH 12G

## (undated) DEVICE — SUT VICRYL 1 18" CT-1 UNDYED (POP-OFF)

## (undated) DEVICE — DRAPE SURGICAL #1010

## (undated) DEVICE — POSITIONER PINK PAD PIGAZZI SYSTEM XL W ARM PROTECTOR

## (undated) DEVICE — SUT VICRYL 0 27" UR-6

## (undated) DEVICE — WARMING BLANKET UPPER ADULT

## (undated) DEVICE — BLADE SURGICAL #12 CARBON STEEL

## (undated) DEVICE — SOL IRR BAG NS 0.9% 3000ML

## (undated) DEVICE — TUBING STRYKER ARTHROSCOPY INFLOW

## (undated) DEVICE — DRSG GAUZE VASELINE PETROLEUM 3 X 9"

## (undated) DEVICE — FOLEY TRAY 14FR 5CC LF UMETER CLOSED

## (undated) DEVICE — XI TIP COVER

## (undated) DEVICE — ENDOCATCH 10MM SPECIMEN POUCH

## (undated) DEVICE — TROCAR ETHICON ENDOPATH XCEL BLADELESS 11MM X 100MM STABILITY

## (undated) DEVICE — RAYTEC SPONGE 4X4 16PLY

## (undated) DEVICE — SUT ETHIBOND 0 30" CT-2

## (undated) DEVICE — ENDO LOOP LINA GOLD LOOP FOR LSH 200MM X 100MM LG CONNECTOR

## (undated) DEVICE — SUT MAXON 0 30" HGU-46

## (undated) DEVICE — ENDOCATCH II 15MM

## (undated) DEVICE — SUT VICRYL PLUS 0 36" CT-1

## (undated) DEVICE — PACK GYN WDC

## (undated) DEVICE — PREP CHLORAPREP HI-LITE ORANGE 26ML

## (undated) DEVICE — VENODYNE/SCD SLEEVE CALF MEDIUM

## (undated) DEVICE — ELCTR THUNDERBEAT HANDPIECE 5MM X 35CM FRONT CONTROL

## (undated) DEVICE — ENDOPATH 5MM CVD SCISSOR W MONOPOLAR CAUTERY DISP

## (undated) DEVICE — TUBING RANGER FLUID IRRIGATION SET DISP

## (undated) DEVICE — TUBING TUR 2 PRONG

## (undated) DEVICE — DRAPE 3/4 SHEET 52X76"

## (undated) DEVICE — LIGASURE MARYLAND 37CM

## (undated) DEVICE — SUT PDS II 0 27" CT-1

## (undated) DEVICE — NDL GRASPING DISP

## (undated) DEVICE — SUT SILK 0 18" TIES

## (undated) DEVICE — GLV 8 PROTEXIS (WHITE)

## (undated) DEVICE — DRAPE STERI-DRAPE INCISE 32X33"

## (undated) DEVICE — INSUFFLATION NDL COVIDIEN SURGINEEDLE VERESS 120MM

## (undated) DEVICE — Device